# Patient Record
Sex: MALE | Race: WHITE | Employment: OTHER | URBAN - NONMETROPOLITAN AREA
[De-identification: names, ages, dates, MRNs, and addresses within clinical notes are randomized per-mention and may not be internally consistent; named-entity substitution may affect disease eponyms.]

---

## 2023-08-25 ENCOUNTER — OUTSIDE SERVICES (OUTPATIENT)
Dept: FAMILY MEDICINE CLINIC | Age: 58
End: 2023-08-25

## 2023-08-25 DIAGNOSIS — Z59.00 HOMELESS: ICD-10-CM

## 2023-08-25 DIAGNOSIS — I10 HYPERTENSION, UNSPECIFIED TYPE: ICD-10-CM

## 2023-08-25 DIAGNOSIS — Z72.0 TOBACCO ABUSE: ICD-10-CM

## 2023-08-25 DIAGNOSIS — J44.9 CHRONIC OBSTRUCTIVE PULMONARY DISEASE, UNSPECIFIED COPD TYPE (HCC): ICD-10-CM

## 2023-08-25 DIAGNOSIS — Z91.199 NONCOMPLIANCE: Primary | ICD-10-CM

## 2023-08-25 DIAGNOSIS — E11.69 TYPE 2 DIABETES MELLITUS WITH OTHER SPECIFIED COMPLICATION, UNSPECIFIED WHETHER LONG TERM INSULIN USE (HCC): ICD-10-CM

## 2023-08-25 SDOH — ECONOMIC STABILITY - HOUSING INSECURITY: HOMELESSNESS UNSPECIFIED: Z59.00

## 2023-08-25 ASSESSMENT — ENCOUNTER SYMPTOMS
COUGH: 1
SHORTNESS OF BREATH: 1

## 2023-08-25 NOTE — PROGRESS NOTES
SUBJECTIVE:    Patient ID: Sharlotte Pu i\    [facility] home admission  60-year-old white male admitted to the nursing home after being in the hospital at [facility] [address] [place] with complaints of shortness of breath lightheadedness and apparently some confusion. He had some significant hypertension. He was found to have a COPD exacerbation. He has a long history of tobacco use. He is currently homeless. He is been noncompliant with his medicines. He does have a history of sleep apnea has not been using his CPAP. Due to his current home situation. He does also have a history of diabetes. He does have a history of being on metformin though has not been taking that either. Review of Systems   Constitutional:  Positive for fatigue. Respiratory:  Positive for cough and shortness of breath. Musculoskeletal:  Positive for arthralgias, gait problem and myalgias. Neurological:  Positive for weakness. Psychiatric/Behavioral:  The patient is nervous/anxious. OBJECTIVE:  There were no vitals taken for this visit. Wt Readings from Last 3 Encounters:   No data found for Wt     BP Readings from Last 3 Encounters:   No data found for BP      Pulse Readings from Last 3 Encounters:   No data found for Pulse     There is no height or weight on file to calculate BMI. Resp Readings from Last 3 Encounters:   No data found for Resp     Past medical, surgical, family and social history were reviewed and updated with the patient. Physical Exam  Vitals and nursing note reviewed. Constitutional:       Appearance: Normal appearance. He is well-developed. HENT:      Head: Normocephalic. Right Ear: Hearing and external ear normal.      Left Ear: Hearing and external ear normal.      Nose: Nose normal.      Mouth/Throat:      Lips: Pink. Eyes:      General: Lids are normal.      Conjunctiva/sclera: Conjunctivae normal.   Neck:      Thyroid: No thyroid mass, thyromegaly or thyroid tenderness.       Vascular: No

## 2023-09-08 PROCEDURE — 99307 SBSQ NF CARE SF MDM 10: CPT | Performed by: FAMILY MEDICINE

## 2023-09-11 ENCOUNTER — OUTSIDE SERVICES (OUTPATIENT)
Dept: FAMILY MEDICINE CLINIC | Age: 58
End: 2023-09-11
Payer: MEDICARE

## 2023-09-11 DIAGNOSIS — Z91.199 NONCOMPLIANCE: ICD-10-CM

## 2023-09-11 DIAGNOSIS — I10 HYPERTENSION, UNSPECIFIED TYPE: ICD-10-CM

## 2023-09-11 DIAGNOSIS — J44.9 CHRONIC OBSTRUCTIVE PULMONARY DISEASE, UNSPECIFIED COPD TYPE (HCC): Primary | ICD-10-CM

## 2023-09-11 DIAGNOSIS — E11.69 TYPE 2 DIABETES MELLITUS WITH OTHER SPECIFIED COMPLICATION, UNSPECIFIED WHETHER LONG TERM INSULIN USE (HCC): ICD-10-CM

## 2023-09-11 DIAGNOSIS — Z59.00 HOMELESS: ICD-10-CM

## 2023-09-11 SDOH — ECONOMIC STABILITY - HOUSING INSECURITY: HOMELESSNESS UNSPECIFIED: Z59.00

## 2023-09-11 ASSESSMENT — ENCOUNTER SYMPTOMS: SHORTNESS OF BREATH: 1

## 2023-09-11 NOTE — PROGRESS NOTES
SUBJECTIVE:    Patient ID: Jak Farrell is a 63 y.o. male. Chief Complaint   Patient presents with    Extremity Weakness       HPI: nursing home visit  Regulatory visit    He seems to be settled in. He is not really complain of significant shortness of breath. Still smoking quite a bit. Still having some issues keeping his blood sugars down. He admits that he definitely struggles with his diet. He has not had any chest pain. His shortness of breath is stable. He has not had any recent falls or injuries. He seems to be doing well with his blood pressures. Is concerned about making sure he has what ever he needs when he does leave the building. Obviously he has been homeless for some time that is a big concern to him. Review of Systems   Constitutional:  Positive for fatigue. Respiratory:  Positive for shortness of breath. Musculoskeletal:  Positive for arthralgias, gait problem and myalgias. Neurological:  Positive for weakness. Psychiatric/Behavioral:  The patient is nervous/anxious. All other systems reviewed and are negative. OBJECTIVE:  There were no vitals taken for this visit. Wt Readings from Last 3 Encounters:   No data found for Wt     BP Readings from Last 3 Encounters:   No data found for BP      Pulse Readings from Last 3 Encounters:   No data found for Pulse     There is no height or weight on file to calculate BMI. Resp Readings from Last 3 Encounters:   No data found for Resp     Past medical, surgical, family and social history were reviewed and updated with the patient. Physical Exam  Vitals and nursing note reviewed. Constitutional:       Appearance: Normal appearance. He is well-developed. HENT:      Head: Normocephalic. Right Ear: Hearing and external ear normal.      Left Ear: Hearing and external ear normal.      Nose: Nose normal.      Mouth/Throat:      Lips: Pink.    Eyes:      General: Lids are normal.      Conjunctiva/sclera: Conjunctivae
Vital Signs Last 24 Hrs  T(C): 36.6 (24 Sep 2017 17:36), Max: 37.2 (24 Sep 2017 13:10)  T(F): 97.8 (24 Sep 2017 17:36), Max: 99 (24 Sep 2017 13:10)  HR: 69 (24 Sep 2017 17:36) (69 - 70)  BP: 144/75 (24 Sep 2017 17:36) (124/83 - 147/97)  BP(mean): --  RR: 18 (24 Sep 2017 17:36) (16 - 18)  SpO2: 95% (24 Sep 2017 17:36) (95% - 100%)  I&O's Summary    CAPILLARY BLOOD GLUCOSE                                14.2   12.0  )-----------( 387      ( 24 Sep 2017 15:07 )             44.2     09-24    143  |  103  |  19  ----------------------------<  96  4.2   |  25  |  0.81    Ca    10.2      24 Sep 2017 15:07    TPro  8.0  /  Alb  4.2  /  TBili  0.4  /  DBili  x   /  AST  17  /  ALT  13  /  AlkPhos  80  09-24        LIVER FUNCTIONS - ( 24 Sep 2017 15:07 )  Alb: 4.2 g/dL / Pro: 8.0 g/dL / ALK PHOS: 80 U/L / ALT: 13 U/L RC / AST: 17 U/L / GGT: x           PT/INR - ( 24 Sep 2017 15:07 )   PT: 15.6 sec;   INR: 1.42 ratio         PTT - ( 24 Sep 2017 15:07 )  PTT:37.2 sec      Labs/imaging personally reviewed: mild leukocytosis

## 2023-10-04 PROCEDURE — 99307 SBSQ NF CARE SF MDM 10: CPT | Performed by: FAMILY MEDICINE

## 2023-10-25 ENCOUNTER — OUTSIDE SERVICES (OUTPATIENT)
Dept: FAMILY MEDICINE CLINIC | Age: 58
End: 2023-10-25
Payer: MEDICARE

## 2023-10-25 DIAGNOSIS — E11.69 TYPE 2 DIABETES MELLITUS WITH OTHER SPECIFIED COMPLICATION, UNSPECIFIED WHETHER LONG TERM INSULIN USE (HCC): ICD-10-CM

## 2023-10-25 DIAGNOSIS — Z59.00 HOMELESS: ICD-10-CM

## 2023-10-25 DIAGNOSIS — Z91.199 NONCOMPLIANCE: ICD-10-CM

## 2023-10-25 DIAGNOSIS — J44.9 CHRONIC OBSTRUCTIVE PULMONARY DISEASE, UNSPECIFIED COPD TYPE (HCC): ICD-10-CM

## 2023-10-25 DIAGNOSIS — R53.1 WEAKNESS: Primary | ICD-10-CM

## 2023-10-25 DIAGNOSIS — I10 HYPERTENSION, UNSPECIFIED TYPE: ICD-10-CM

## 2023-10-25 SDOH — ECONOMIC STABILITY - HOUSING INSECURITY: HOMELESSNESS UNSPECIFIED: Z59.00

## 2023-10-25 ASSESSMENT — ENCOUNTER SYMPTOMS: SHORTNESS OF BREATH: 1

## 2023-10-25 NOTE — PROGRESS NOTES
SUBJECTIVE:    Patient ID: Ana Laura Holley is a 63 y.o. male. Chief Complaint   Patient presents with    Depression    weaknees       HPI: nursing home visit  Thing home regulatory visit with him today about his recent admission. He is doing somewhat better. Still quite weak. Do not see him walking. He is most all the time in a wheelchair paddling around up and down the halls. He does seem to be in good spirits. He seems to be eating pretty well. He seems to have settled in well. He is not having any significant chest pain shortness of breath or other symptoms per his report. Review of Systems   Constitutional:  Positive for fatigue. Respiratory:  Positive for shortness of breath. Musculoskeletal:  Positive for arthralgias, gait problem and myalgias. Neurological:  Positive for weakness. Psychiatric/Behavioral:  The patient is nervous/anxious. All other systems reviewed and are negative. OBJECTIVE:  There were no vitals taken for this visit. Wt Readings from Last 3 Encounters:   No data found for Wt     BP Readings from Last 3 Encounters:   No data found for BP      Pulse Readings from Last 3 Encounters:   No data found for Pulse     There is no height or weight on file to calculate BMI. Resp Readings from Last 3 Encounters:   No data found for Resp     Past medical, surgical, family and social history were reviewed and updated with the patient. Physical Exam  Vitals and nursing note reviewed. Constitutional:       Appearance: Normal appearance. He is well-developed. HENT:      Head: Normocephalic. Right Ear: Hearing and external ear normal.      Left Ear: Hearing and external ear normal.      Nose: Nose normal.      Mouth/Throat:      Lips: Pink. Eyes:      General: Lids are normal.      Conjunctiva/sclera: Conjunctivae normal.   Neck:      Thyroid: No thyroid mass, thyromegaly or thyroid tenderness. Vascular: No carotid bruit.       Trachea: Trachea and phonation

## 2023-12-04 ENCOUNTER — OUTSIDE SERVICES (OUTPATIENT)
Dept: FAMILY MEDICINE CLINIC | Age: 58
End: 2023-12-04
Payer: MEDICARE

## 2023-12-04 VITALS
SYSTOLIC BLOOD PRESSURE: 117 MMHG | BODY MASS INDEX: 34.04 KG/M2 | RESPIRATION RATE: 18 BRPM | TEMPERATURE: 97.7 F | HEIGHT: 68 IN | WEIGHT: 224.6 LBS | DIASTOLIC BLOOD PRESSURE: 72 MMHG | OXYGEN SATURATION: 86 % | HEART RATE: 95 BPM

## 2023-12-04 DIAGNOSIS — E78.2 MIXED HYPERLIPIDEMIA: ICD-10-CM

## 2023-12-04 DIAGNOSIS — E11.69 TYPE 2 DIABETES MELLITUS WITH OTHER SPECIFIED COMPLICATION, UNSPECIFIED WHETHER LONG TERM INSULIN USE (HCC): Primary | ICD-10-CM

## 2023-12-04 DIAGNOSIS — J44.9 CHRONIC OBSTRUCTIVE PULMONARY DISEASE, UNSPECIFIED COPD TYPE (HCC): ICD-10-CM

## 2023-12-04 DIAGNOSIS — I10 HYPERTENSION, UNSPECIFIED TYPE: ICD-10-CM

## 2023-12-04 DIAGNOSIS — Z91.199 NONCOMPLIANCE: ICD-10-CM

## 2023-12-04 DIAGNOSIS — Z59.00 HOMELESS: ICD-10-CM

## 2023-12-04 PROCEDURE — 99308 SBSQ NF CARE LOW MDM 20: CPT | Performed by: NURSE PRACTITIONER

## 2023-12-04 SDOH — ECONOMIC STABILITY - HOUSING INSECURITY: HOMELESSNESS UNSPECIFIED: Z59.00

## 2023-12-04 NOTE — PROGRESS NOTES
SUBJECTIVE:  Seun Kruger is a 58 y.o. male that presents with   Chief Complaint   Patient presents with    Diabetes    Hypertension    COPD   .    HPI:    This is a 58-year-old male currently resides at [facility] nursing and re[facility] [place] this is his [place] mandated 60-day regulatory nursing home follow-up.  He has a past medical history significant for diabetes type 2 hypertension hyperlipidemia COPD tobacco abuse sleep apnea not treated obesity and homelessness noncompliance think secondary to homelessness.  He has been here for a few months now and says that things are okay he has prediabetes doing well his blood pressure is good his O2 sat was a little low this morning at 86% but when I checked this that it had come back up to 94.  He is taking all of his medications and tolerating them well.  He has some problems with sleep sometimes but he does have as needed orders for that.  He has pedal edema bilateral rhonchi respirations are even and unlabored however skin color is a little pale.        Review of Systems   Constitutional:  Positive for activity change and fatigue.   HENT: Negative.     Eyes: Negative.    Respiratory:  Positive for cough and shortness of breath.    Cardiovascular:  Positive for leg swelling.   Gastrointestinal: Negative.    Endocrine: Negative.    Genitourinary: Negative.    Musculoskeletal:  Positive for arthralgias, back pain and myalgias.   Skin: Negative.    Neurological:  Positive for tremors and weakness.   Hematological:  Bruises/bleeds easily.   Psychiatric/Behavioral:  Positive for decreased concentration and dysphoric mood. The patient is nervous/anxious.    All other systems reviewed and are negative.       OBJECTIVE:  /72   Pulse 95   Temp 97.7 °F (36.5 °C)   Resp 18   Ht 1.727 m (5' 8\")   Wt 101.9 kg (224 lb 9.6 oz)   SpO2 (!) 86%   BMI 34.15 kg/m²   Physical Exam  Vitals and nursing note reviewed.   Constitutional:       Appearance: Normal appearance. He is

## 2024-01-01 ASSESSMENT — ENCOUNTER SYMPTOMS
GASTROINTESTINAL NEGATIVE: 1
BACK PAIN: 1
EYES NEGATIVE: 1
SHORTNESS OF BREATH: 1
COUGH: 1